# Patient Record
Sex: FEMALE | Race: WHITE
[De-identification: names, ages, dates, MRNs, and addresses within clinical notes are randomized per-mention and may not be internally consistent; named-entity substitution may affect disease eponyms.]

---

## 2020-03-30 ENCOUNTER — HOSPITAL ENCOUNTER (EMERGENCY)
Dept: HOSPITAL 33 - ED | Age: 75
Discharge: HOME | End: 2020-03-30
Payer: MEDICARE

## 2020-03-30 VITALS — HEART RATE: 73 BPM | SYSTOLIC BLOOD PRESSURE: 131 MMHG | OXYGEN SATURATION: 95 % | DIASTOLIC BLOOD PRESSURE: 65 MMHG

## 2020-03-30 DIAGNOSIS — M79.661: ICD-10-CM

## 2020-03-30 DIAGNOSIS — M79.662: Primary | ICD-10-CM

## 2020-03-30 LAB
ALBUMIN SERPL-MCNC: 3.6 G/DL (ref 3.5–5)
ALP SERPL-CCNC: 127 U/L (ref 38–126)
ALT SERPL-CCNC: 9 U/L (ref 0–35)
ANION GAP SERPL CALC-SCNC: 12.7 MEQ/L (ref 5–15)
AST SERPL QL: 20 U/L (ref 14–36)
BILIRUB BLD-MCNC: 0.5 MG/DL (ref 0.2–1.3)
BUN SERPL-MCNC: 18 MG/DL (ref 7–17)
CALCIUM SPEC-MCNC: 9.2 MG/DL (ref 8.4–10.2)
CELLS COUNTED: 100
CHLORIDE SERPL-SCNC: 104 MMOL/L (ref 98–107)
CO2 SERPL-SCNC: 26 MMOL/L (ref 22–30)
CREAT SERPL-MCNC: 0.68 MG/DL (ref 0.52–1.04)
GLUCOSE SERPL-MCNC: 89 MG/DL (ref 74–106)
HCT VFR BLD AUTO: 29.4 % (ref 35–47)
HGB BLD-MCNC: 8.3 GM/DL (ref 12–16)
MANUAL DIF COMMENT BLD-IMP: (no result)
MCH RBC QN AUTO: 20.6 PG (ref 26–32)
MCHC RBC AUTO-ENTMCNC: 28.2 G/DL (ref 32–36)
PLATELET # BLD AUTO: 504 K/MM3 (ref 150–450)
POLYCHROMASIA BLD QL SMEAR: (no result)
POTASSIUM SERPLBLD-SCNC: 3.7 MMOL/L (ref 3.5–5.1)
PROT SERPL-MCNC: 7.8 G/DL (ref 6.3–8.2)
RBC # BLD AUTO: 4.02 M/MM3 (ref 4.1–5.4)
SODIUM SERPL-SCNC: 139 MMOL/L (ref 137–145)
TOXIC GRANULES BLD QL SMEAR: (no result)
WBC # BLD AUTO: 11.3 K/MM3 (ref 4–10.5)

## 2020-03-30 PROCEDURE — 96360 HYDRATION IV INFUSION INIT: CPT

## 2020-03-30 PROCEDURE — 80053 COMPREHEN METABOLIC PANEL: CPT

## 2020-03-30 PROCEDURE — 84484 ASSAY OF TROPONIN QUANT: CPT

## 2020-03-30 PROCEDURE — 36000 PLACE NEEDLE IN VEIN: CPT

## 2020-03-30 PROCEDURE — 93005 ELECTROCARDIOGRAM TRACING: CPT

## 2020-03-30 PROCEDURE — 99284 EMERGENCY DEPT VISIT MOD MDM: CPT

## 2020-03-30 PROCEDURE — 96375 TX/PRO/DX INJ NEW DRUG ADDON: CPT

## 2020-03-30 PROCEDURE — 96374 THER/PROPH/DIAG INJ IV PUSH: CPT

## 2020-03-30 PROCEDURE — 36415 COLL VENOUS BLD VENIPUNCTURE: CPT

## 2020-03-30 PROCEDURE — 71045 X-RAY EXAM CHEST 1 VIEW: CPT

## 2020-03-30 PROCEDURE — 85025 COMPLETE CBC W/AUTO DIFF WBC: CPT

## 2020-03-30 NOTE — XRAY
Indication: Confusion.  Pneumonia.



Comparison: None



Portable chest demonstrates prominent interstitial lung markings and mild

bibasilar infiltrates/atelectasis.  Left base nipple shadow.  No

consolidation, large effusion, or pneumothorax.  Heart is not enlarged for AP

portable technique.  Descending aorta slightly tortuous.  Bony thorax intact

with mild osteopenia and degenerative changes.



Impression:

1.  Bibasilar infiltrates/atelectasis.  Correlate clinically.

2.  Prominent interstitial lung markings probably chronic.  Comparison studies

would be of benefit.



Comment: Preliminary interpretation was made by [facility].  No critical discrepancy.

## 2020-03-30 NOTE — ERPHSYRPT
- History of Present Illness


Time Seen by Provider: 03/30/20 01:20


Source: patient, EMS


Exam Limitations: no limitations


Physician History: 





Patient is here complaining of bilateral leg pain.  She states her pain started 

suddenly approximately 1 hour ago.  It is bilateral.  She describes no 

weakness.  However, she does describe some upper body numbness.  She has some 

feelings of paresthesias without motor deficit.  She has no falls or trauma.  

Patient is coming from a [place].  She denies any chest pain or shortness of 

breath.  She denies any recent fever, nausea, vomiting.  Patient does state 

that she had a cough on the way and once that she went out to the cold weather.


Timing/Duration: today


Severity: mild


Allergies/Adverse Reactions: 








No Known Drug Allergies Allergy (Unverified 03/30/20 01:59)


 





Home Medications: 








Bupropion HCl [Bupropion HCl ER] 150 mg PO BID 03/30/20 [History]


Ferrous Sulfate 325 mg PO TID 03/30/20 [History]


Fluoxetine HCl 80 mg PO DAILY 03/30/20 [History]


Hydrocodone/APAP 5-325 Tab^^^ [Norco 5-325 Tablet^^^] 5 - 325 mg PO Q4H PRN PRN 

03/30/20 [History]


Lorazepam 0.5 mg*** [Ativan 0.5 MG***] 0.5 mg PO Q4H PRN PRN 03/30/20 [History]


Nicotine 21 mg** [Nicoderm CQ 21 MG***] 21 mg TD DAILY 03/30/20 [History]


Omeprazole 20 mg PO DAILY 03/30/20 [History]


Polyethylene Glycol 3350 [Clearlax] 17 gm PO DAILY 03/30/20 [History]


raNITIdine HCl [Zantac] 150 mg PO BID 03/30/20 [History]








Travel Risk





- International Travel


Have you traveled outside of the country in past 3 weeks: No


Have you or anyone close to you been diagnosed with or: No


Do your reside in a community with a known COVID-19 case?: No





- Coronavirus Screening


Has patient experienced Coronavirus symptoms: No





- Review of Systems


Constitutional: No Fever, No Chills


Eyes: No Symptoms


Ears, Nose, & Throat: No Symptoms


Respiratory: No Cough, No Dyspnea


Cardiac: No Chest Pain, No Edema, No Syncope


Abdominal/Gastrointestinal: No Abdominal Pain, No Nausea, No Vomiting, No 

Diarrhea


Genitourinary Symptoms: No Dysuria


Musculoskeletal: Other (Bilateral leg pain with upper extremities paresthesias.)

, No Back Pain, No Neck Pain


Skin: No Rash


Neurological: No Dizziness, No Focal Weakness, No Sensory Changes


Psychological: No Symptoms


Endocrine: No Symptoms


All Other Systems: Reviewed and Negative





- Nursing Vital Signs


Nursing Vital Signs: 


 Initial Vital Signs











Temperature  97.8 F   03/30/20 01:21


 


Pulse Rate  85   03/30/20 01:21


 


Respiratory Rate  20   03/30/20 01:21


 


Blood Pressure  151/81   03/30/20 01:21


 


O2 Sat by Pulse Oximetry  99   03/30/20 01:21








 Pain Scale











Pain Intensity [Right Lower]   10


 


Pain Intensity [Left Lower]    10


 


Pain Intensity                 10

















- Physical Exam


General Appearance: no apparent distress, alert


Eye Exam: PERRL/EOMI, eyes nml inspection


Ears, Nose, Throat Exam: normal ENT inspection, TMs normal, pharynx normal, 

moist mucous membranes


Neck Exam: normal inspection, non-tender, supple, full range of motion


Respiratory Exam: normal breath sounds, lungs clear, No respiratory distress


Cardiovascular Exam: regular rate/rhythm, normal heart sounds, normal 

peripheral pulses


Gastrointestinal/Abdomen Exam: soft, normal bowel sounds, No tenderness, No mass


Back Exam: normal inspection, normal range of motion, No CVA tenderness, No 

vertebral tenderness


Extremity Exam: normal inspection, normal range of motion, pelvis stable


Neurologic Exam: alert, oriented x 3, cooperative, normal mood/affect, nml 

cerebellar function, nml station & gait, sensation nml, No motor deficits


Skin Exam: normal color, warm, dry, No rash


Lymphatic Exam: No adenopathy


**SpO2 Interpretation**: normal


Comments: 





03/30/20 01:29


Motor: There is no pronator drift of out-stretched arms. Muscle bulk and tone 

are normal.


Strength is full bilaterally.


Reflexes: Reflexes are 2+ and symmetric at the biceps, triceps, knees, and 

ankles. Plantar


responses are flexor.


Sensory: Light touch sense are intact in bilateral upper and lower extremities. 

There is no sign


of neglect.


Coordination: Rapid alternating movements are intact. There is no dysmetria on 

finger-to-nose


and heel-knee-shin. There are no abnormal or extraneous movements. Romberg is 

absent.


Gait/Stance: Posture is normal. Gait is steady with normal steps, base, arm 

swing, and turning.


Heel and toe walking are normal. Tandem gait is normal.


Mental status:The patient is alert, attentive, and oriented.


Speech: clear and fluent with good repetition, comprehension, and naming.


Ordered Tests: 


 Active Orders 24 hr











 Category Date Time Status


 


 EKG-ER Only STAT Care  03/30/20 01:21 Active


 


 IV Insertion STAT Care  03/30/20 01:21 Active


 


 CHEST 1 VIEW (PORTABLE) Stat Exams  03/30/20 01:22 Taken


 


 CBC W DIFF Stat Lab  03/30/20 02:03 Completed


 


 CMP Stat Lab  03/30/20 02:03 Completed


 


 Manual Differential NC Stat Lab  03/30/20 02:03 Completed


 


 TROPONIN Q3H Lab  03/30/20 02:03 Received


 


 TROPONIN Q3H Lab  03/30/20 04:30 Ordered


 


 TROPONIN Q3H Lab  03/30/20 07:30 Ordered


 


 TROPONIN Q3H Lab  03/30/20 10:30 Ordered


 


 TROPONIN Q3H Lab  03/30/20 13:30 Ordered








Medication Summary














Discontinued Medications














Generic Name Dose Route Start Last Admin





  Trade Name Freq  PRN Reason Stop Dose Admin


 


Diphenhydramine HCl  25 mg  03/30/20 01:21  03/30/20 01:38





  Benadryl 50 Mg/Ml***  IV  03/30/20 01:22  25 mg





  STAT ONE   Administration





     





     





     





     


 


Diphenhydramine HCl  Confirm  03/30/20 01:28  





  Benadryl 50 Mg/Ml***  Administered  03/30/20 01:29  





  Dose   





  50 mg   





  .ROUTE   





  .STK-MED ONE   





     





     





     





     


 


Droperidol  1.25 mg  03/30/20 01:21  03/30/20 01:37





  Inapsine 5 Mg/2 Ml***  IV  03/30/20 01:22  1.25 mg





  STAT ONE   Administration





     





     





     





     


 


Droperidol  Confirm  03/30/20 01:28  





  Inapsine 5 Mg/2 Ml***  Administered  03/30/20 01:29  





  Dose   





  5 mg   





  .ROUTE   





  .STK-MED ONE   





     





     





     





     


 


Sodium Chloride  1,000 mls @ 999 mls/hr  03/30/20 01:21  03/30/20 01:38





  Sodium Chloride 0.9% 1000 Ml  IV  03/30/20 02:21  999 mls/hr





  .Q1H1M STA   Administration





     





     





     





     


 


Sodium Chloride  Confirm  03/30/20 01:28  





  Sodium Chloride 0.9% 1000 Ml  Administered  03/30/20 01:29  





  Dose   





  1,000 mls @ ud   





  .ROUTE   





  .STK-MED ONE   





     





     





     





     


 


Morphine Sulfate  2 mg  03/30/20 01:21  03/30/20 01:37





  Morphine Sulfate 2 Mg Inj***  IV  03/30/20 01:22  2 mg





  STAT ONE   Administration





     





     





     





     


 


Morphine Sulfate  Confirm  03/30/20 01:28  





  Morphine Sulfate 2 Mg Inj***  Administered  03/30/20 01:29  





  Dose   





  2 mg   





  .ROUTE   





  .STK-MED ONE   





     





     





     





     











Lab/Rad Data: 


 Laboratory Result Diagrams





 03/30/20 02:03 





 03/30/20 02:03 





 Laboratory Results











  03/30/20 03/30/20 Range/Units





  02:03 02:03 


 


WBC   11.3 H  (4.0-10.5)  K/mm3


 


RBC   4.02 L  (4.1-5.4)  M/mm3


 


Hgb   8.3 L  (12.0-16.0)  gm/dl


 


Hct   29.4 L  (35-47)  %


 


MCV   73.1 L  ()  fl


 


MCH   20.6 L  (26-32)  pg


 


MCHC   28.2 L  (32-36)  g/dl


 


RDW   19.4 H  (11.5-14.0)  %


 


Plt Count   504 H  (150-450)  K/mm3


 


MPV   8.5  (7.5-11.0)  fl


 


Sodium  139   (137-145)  mmol/L


 


Potassium  3.7   (3.5-5.1)  mmol/L


 


Chloride  104   ()  mmol/L


 


Carbon Dioxide  26   (22-30)  mmol/L


 


Anion Gap  12.7   (5-15)  MEQ/L


 


BUN  18 H   (7-17)  mg/dL


 


Creatinine  0.68   (0.52-1.04)  mg/dL


 


Estimated GFR  > 60.0   ML/MIN


 


Glucose  89   ()  mg/dL


 


Calcium  9.2   (8.4-10.2)  mg/dL


 


Total Bilirubin  0.50   (0.2-1.3)  mg/dL


 


AST  20   (14-36)  U/L


 


ALT  9   (0-35)  U/L


 


Alkaline Phosphatase  127 H   ()  U/L


 


Serum Total Protein  7.8   (6.3-8.2)  g/dL


 


Albumin  3.6   (3.5-5.0)  g/dL














- Progress


Progress: improved


Progress Note: 





03/30/20 01:29


Lower suspicion for stroke given normal neurological exam.  Patient also has no 

focal symptoms on history or physical.  Will obtain basic labs.


- We'll obtain basic labs, fluids, EKG, troponin, chest x-ray


- I feel comfortable with one time negative troponin given symptoms have 

improved and started greater


then 6 hours ago.


- EKG shows no ST changes - my read. See full read below.


- O2 saturations consistently greater than 95%.


- CXR shows no pneumonia, pneumothorax - my read


- no other obvious lab abnormalities


03/30/20 02:34


Overall patient feels improved.  No obvious lab abnormalities.  Patient is now 

resting comfortably in the room.  Unclear cause for patient's symptoms.  This 

point time we will discharge patient home.  She will need close follow-up with 

PCP.  Should return here for new or changing symptoms.





- Departure


Departure Disposition: Home


Clinical Impression: 


 Bilateral leg pain





Condition: Stable


Critical Care Time: No


Referrals: 


APRYL RUSSELL DO [Primary Care Provider] - 


Instructions:  Chronic Pain (DC)